# Patient Record
(demographics unavailable — no encounter records)

---

## 2024-11-01 NOTE — HISTORY OF PRESENT ILLNESS
[TextBox_4] : 9/25/24: Initial visit for this very pleasant 69-year-old gentleman complaining of gradually increasing shortness of breath over the last 3 to 4 months. He has smoked since his teenage years, generally about half a pack per day, at least 50 years. He is currently cut down to about 3 to 4 cigarettes a day. Reports shortness of breath on climbing 2 flights of stairs, okay on level ground, denies cough or wheeze. No sputum production. Never been hospitalized or been to an emergency room for a breathing problem and has never been diagnosed with pneumonia. He tells me he has been diagnosed with some "minor coronary calcification", on rosuvastatin and metformin, no history of diabetes. Sleeps from 5 AM to 11:30 AM, denies daytime sleepiness or observed snoring. He recently had CT of the chest at [facility], he brought in a copy of the report as well as images to review. Report mentions a 3 mm nodule in the right upper lobe, does not suggest any follow-up for this, also mild emphysema and some bronchial thickening.  10/30/24:  pulmonary function testing from 10/15 reviewed w pt- mild obstruction with good exercise capacity 5786m on 6MWT, no desaturation.  Small BD response. No clinical changes since last seen.

## 2024-11-01 NOTE — PHYSICAL EXAM
[No Acute Distress] : no acute distress [Normal Oropharynx] : normal oropharynx [Normal Appearance] : normal appearance [No Neck Mass] : no neck mass [No Resp Distress] : no resp distress [Normal to Percussion] : normal to percussion [No Abnormalities] : no abnormalities [Normal Gait] : normal gait [No Clubbing] : no clubbing [No Cyanosis] : no cyanosis [No Edema] : no edema [FROM] : FROM [Normal Color/ Pigmentation] : normal color/ pigmentation [No Focal Deficits] : no focal deficits [Oriented x3] : oriented x3 [Normal Affect] : normal affect [TextBox_68] : Few crackles at left base clear with cough, otherwise clear